# Patient Record
Sex: MALE | Race: WHITE
[De-identification: names, ages, dates, MRNs, and addresses within clinical notes are randomized per-mention and may not be internally consistent; named-entity substitution may affect disease eponyms.]

---

## 2019-12-18 ENCOUNTER — HOSPITAL ENCOUNTER (EMERGENCY)
Dept: HOSPITAL 74 - JER | Age: 54
Discharge: HOME | End: 2019-12-18
Payer: COMMERCIAL

## 2019-12-18 VITALS — TEMPERATURE: 98 F

## 2019-12-18 VITALS — HEART RATE: 84 BPM | SYSTOLIC BLOOD PRESSURE: 138 MMHG | DIASTOLIC BLOOD PRESSURE: 77 MMHG

## 2019-12-18 VITALS — BODY MASS INDEX: 38.7 KG/M2

## 2019-12-18 DIAGNOSIS — F19.920: Primary | ICD-10-CM

## 2019-12-18 DIAGNOSIS — G62.9: ICD-10-CM

## 2019-12-18 DIAGNOSIS — E03.9: ICD-10-CM

## 2019-12-18 NOTE — PDOC
Attending Attestation





- Resident


Resident Name: Pineda Prieto





- ED Attending Attestation


I have performed the following: I have examined & evaluated the patient, The 

case was reviewed & discussed with the resident, I agree w/resident's findings 

& plan, Exceptions are as noted





- HPI


HPI: 





12/18/19 16:04


54 M with h/o hypothyroid, peripheral neuropathy, presenting to ED after 

smoking K2. Pt states that he smokes K2 occasionally. He has no complaints but 

states that he is here because his wife caught him and called 911. Pt denies 

any other drug use.





- Physicial Exam


PE: 





12/18/19 16:08


"GENERAL: Awake, alert, and fully oriented, in no acute distress.


HEAD: No signs of trauma


EYES: PERRLA, EOMI, sclera anicteric, conjunctiva clear


ENT: Auricles normal inspection, hearing grossly normal, nares patent, 

oropharynx clear without exudates. Moist mucosa


NECK: Nontender, no stepoffs, Normal ROM, supple, no lymphadenopathy, JVD, or 

masses


LUNGS: Breath sounds equal, clear to auscultation bilaterally.  No wheezes, and 

no crackles


HEART: Regular rate and rhythm, normal S1 and S2, no murmurs, rubs or gallops


ABDOMEN: Soft, nontender, normoactive bowel sounds.  No guarding, no rebound.  

No masses


EXTREMITIES: Normal range of motion, no edema.  No clubbing or cyanosis. No 

cords, erythema, or tenderness


NEUROLOGICAL: Cranial nerves II through XII intact. 5/5 strength and sensation 

in all extremities, Normal speech, normal gait, normal cerebellar function


SKIN: Warm, Dry, normal turgor, no rashes or lesions noted.





- Medical Decision Making





12/18/19 16:08


54 M with no complaints, sent to ED by his wife after she caught him smoking 

K2. Pt is awake and alert, clinically sober. Denies SI/HI/AVH. Pt refusing 

medical evaluation at this time.





Pt is well appearing, with normal vitals. Clinically stable for DC at this time.


I discussed the physical exam findings, ancillary test results and final 

diagnoses with the patient. I answered all of the patient's questions. The 

patient was satisfied with the care received and felt comfortable with the 

discharge plan and treatment plan.  The patient agrees to follow up with the 

primary care physician within 24-72 hours.

## 2019-12-18 NOTE — PDOC
History of Present Illness





- General


Chief Complaint: Substance Abuse


Stated Complaint: Substance Abuse


Time Seen by Provider: 12/18/19 14:36


History Source: Patient


Exam Limitations: No Limitations





- History of Present Illness


Initial Comments: 





12/18/19 14:37


HPI: 55yo M with PMH hypothyroidism and peripheral neuropathy in b/l LE 

presenting 3 hours s/p smoking K2 (Mr. Winkler).  Pt reports he feels normal and 

that he is here because his wife wanted him to be evaluated. He denies prior 

reactions to K2, denies any symptoms at the current time. Denies CP, SOB, cough

, nausea, vomiting, fevers, chills, anxiety, agitation, palpitations. Declines 

to explain why he smokes K2. 





ALL: Denies


Meds: Synthroid, gabapentin, cymbalta


PMH: As above


PSH: Finger tumor removal


SHx: Denies smoking / drinking, has smoked K2 multiple times. Lives with his 

wife, works as a 








Past History





- Travel


Traveled outside of the country in the last 30 days: No


Close contact w/someone who was outside of country & ill: No





- Past Medical History


Allergies/Adverse Reactions: 


 Allergies











Allergy/AdvReac Type Severity Reaction Status Date / Time


 


No Known Allergies Allergy   Verified 12/18/19 14:30











COPD: No


Thyroid Disease: Yes (HYPOTHYOIDISM)


Other medical history: NEUROPATHY





- Psycho Social/Smoking Cessation Hx


Smoking History: Never smoked


Have you smoked in the past 12 months: No


Information on smoking cessation initiated: No


Hx Alcohol Use: No


Drug/Substance Use Hx: No





**Review of Systems





- Review of Systems


Able to Perform ROS?: Yes


Is the patient limited English proficient: Yes


Constitutional: No: Symptoms Reported, See HPI, Chills, Diaphoresis, Fever, 

Loss of Appetite, Malaise, Night Sweats, Weakness, Weight Stable, Unintentional 

Wgt. Loss, Unexplained wgt Loss, Other


HEENTM: No: Symptoms Reported, See HPI, Eye Pain, Blurred Vision, Tearing, 

Recent change in vision, Double Vision, Cataracts, Ear Pain, Ocular Prothesis, 

Ear Discharge, Nose Pain, Nose Congestion, Tinnitus, Nose Bleeding, Hearing Loss

, Throat Pain, Throat Swelling, Mouth Pain, Dental Problems, Difficulty 

Swallowing, Mouth Swelling, Other


Respiratory: No: Symptoms reported, See HPI, Cough, Orthopnea, Shortness of 

Breath, SOB with Exertion, SOB at Rest, Stridor, Wheezing, Productive cough, 

Hemoptysis, Other


Cardiac (ROS): No: Symptoms Reported, See HPI, Chest Pain, Edema, Irregular 

Heart Rate, Lightheadedness, Palpitations, Syncope, Chest Tightness, Other


ABD/GI: No: Symptoms Reported, See HPI, Abdominal Distended, Abd. Pain w/ 

defecation, Blood Streaked Bowels, Constipated, Diarrhea, Difficulty Swallowing

, Nausea, Poor Appetite, Poor Fluid Intake, Rectal Bleeding, Vomiting, 

Indigestion, Abdominal cramping, Tarry Stools, Other


: No: Symptoms Reported, See HPI, Burning, Dysuria, Discharge, Frequency, 

Flank Pain, Hematuria, Incontinence, Pain, Urgency, Testicular Mass, Testicular 

Swelling, Lesions, Testicular Pain, Other


Musculoskeletal: No: Symptoms Reported, See HPI, Back Pain, Gout, Joint Pain, 

Joint Swelling, Muscle Pain, Muscle Weakness, Neck Pain, Joint Stiffness, Other


Integumentary: No: Symptoms Reported, See HPI, Bruising, Change in Color, 

Change in Hair/Nails, Dryness, Erythema, Flushing, Lesions, Lumps, Pallor, 

Pruritus, Rash, Sweating, Other


Neurological: No: Symptoms reported, See HPI, Headache, Numbness, Paresthesia, 

Pre-Existing Deficit, Seizure, Tingling, Tremors, Weakness, Unsteady Gait, 

Ataxia, Dizziness, Other


Psychiatric: No: Anxiety, Depression, Frequent Crying, Stressors, Sleep Pattern 

Change, Emotional Problems, Mood Swings, Change in Appetite, Other


Endocrine: No: Symptoms Reported, See HPI, Excessive Sweating, Flushing, 

Intolerance to Cold, Intolerance to Heat, Increased Hunger, Increased Thirst, 

Increased Urine, Unexplained Weight Gain, Unexplained Weight Loss, Change in 

Weight, Other


Hematologic/Lymphatic: No: Symptoms Reported, See HPI, Anemia, Blood Clots, 

Easy Bleeding, Easy Bruising, Bleeding Diathesis, Lymph Node Abnormalities, 

Swollen Glands, Other


All Other Systems: Reviewed and Negative





*Physical Exam





- Vital Signs


 Last Vital Signs











Temp Pulse Resp BP Pulse Ox


 


 98 F   70   18   151/83   97 


 


 12/18/19 14:26  12/18/19 14:26  12/18/19 14:26  12/18/19 14:26  12/18/19 14:26














- Physical Exam





12/18/19 14:37


Vitals reviewed, afebrile, hypertensive 


WDWN man, appears extremely relaxed, laying on stretcher


NCAT, MMM, EOMI, trachea mildline


RRR, nlo s1s2, no murmurs appreciated


CTABL, normal WOB, no wheezes / rales / rhonchi


Obese, soft, nontender, nondistended


2+ Radial and PT pulses


WWP, no clubbing / cyanosis / edema


CN grossly intact, sensation intact throughout, motor intact





Medical Decision Making





- Medical Decision Making





12/18/19 15:07


55yo M with PMH hypothyroidism and peripheral neuropathy in b/l LE presenting 3 

hours s/p smoking K2 (Mr. Winkler). Presents for evaluation when his wife "caught 

him" smoking K2. No complaints. Normal physical exam and vitals (aside from 

hypertension). Will monitor, ambulate, and dispo. 





- EKG


- Ambulate patient





Dispo: Home with stable ambulation





12/18/19 16:31


- EKG NSR, normal axis, normal intervals, normal morphologies without ischemic 

changes


- Patient ambulating well


- Discharge home





Discharge





- Discharge Information


Problems reviewed: Yes


Clinical Impression/Diagnosis: 


Intoxication by drug


Qualifiers:


 Complication of substance-induced condition: uncomplicated Qualified Code(s): 

F19.920 - Other psychoactive substance use, unspecified with intoxication, 

uncomplicated





Condition: Improved


Disposition: HOME





- Admission


No





- Follow up/Referral


Referrals: 


Alexis Rubio [Primary Care Provider] - 





- Patient Discharge Instructions


Additional Instructions: 


You were seen and evaluated in the [facility] ED. Thank you for coming in. 





We would recommend that you refrain from smoking K2. It is an unstudied 

compound and every package can affect you differently - it is unsafe for human 

consumption and could cause permanent disability or death. Do not play roulette 

with your life. 





Please follow up with your PCP within the next week. 





Return to the ED for any new or concerning symptoms including but not limited to

: difficulty breathing or chest pain. 





- Post Discharge Activity

## 2019-12-19 NOTE — EKG
Test Reason : 

Blood Pressure : ***/*** mmHG

Vent. Rate : 065 BPM     Atrial Rate : 065 BPM

   P-R Int : 204 ms          QRS Dur : 110 ms

    QT Int : 424 ms       P-R-T Axes : 010 -23 -13 degrees

   QTc Int : 440 ms

 

NORMAL SINUS RHYTHM

MINIMAL VOLTAGE CRITERIA FOR LVH, MAY BE NORMAL VARIANT

POSSIBLE ANTEROLATERAL INFARCT , AGE UNDETERMINED

ABNORMAL ECG

NO PREVIOUS ECGS AVAILABLE

Confirmed by CARLY ROBISON MD ([id number]) on 12/19/2019 11:54:17 AM

 

Referred By:             Confirmed By:CARLY ROBISON MD